# Patient Record
Sex: FEMALE | Race: WHITE
[De-identification: names, ages, dates, MRNs, and addresses within clinical notes are randomized per-mention and may not be internally consistent; named-entity substitution may affect disease eponyms.]

---
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
## 2019-03-07 ENCOUNTER — HOSPITAL ENCOUNTER (EMERGENCY)
Dept: HOSPITAL 63 - ER | Age: 19
Discharge: HOME | End: 2019-03-07
Payer: OTHER GOVERNMENT

## 2019-03-07 VITALS — DIASTOLIC BLOOD PRESSURE: 60 MMHG | SYSTOLIC BLOOD PRESSURE: 102 MMHG

## 2019-03-07 VITALS — WEIGHT: 158 LBS | BODY MASS INDEX: 24.8 KG/M2 | HEIGHT: 67 IN

## 2019-03-07 DIAGNOSIS — N39.0: Primary | ICD-10-CM

## 2019-03-07 DIAGNOSIS — Z87.440: ICD-10-CM

## 2019-03-07 LAB
AMORPH SED URNS QL MICRO: PRESENT /HPF
APTT PPP: YELLOW S
BACTERIA #/AREA URNS HPF: (no result) /HPF
BILIRUB UR QL STRIP: (no result)
FIBRINOGEN PPP-MCNC: (no result) MG/DL
GLUCOSE UR STRIP-MCNC: (no result) MG/DL
NITRITE UR QL STRIP: (no result)
RBC #/AREA URNS HPF: 0 /HPF (ref 0–2)
SP GR UR STRIP: 1.01
SQUAMOUS #/AREA URNS LPF: (no result) /LPF
UROBILINOGEN UR-MCNC: 0.2 MG/DL
WBC #/AREA URNS HPF: (no result) /HPF (ref 0–4)

## 2019-03-07 PROCEDURE — 87086 URINE CULTURE/COLONY COUNT: CPT

## 2019-03-07 PROCEDURE — 99283 EMERGENCY DEPT VISIT LOW MDM: CPT

## 2019-03-07 PROCEDURE — 81001 URINALYSIS AUTO W/SCOPE: CPT

## 2019-03-07 PROCEDURE — 81025 URINE PREGNANCY TEST: CPT

## 2019-03-07 NOTE — PHYS DOC
Past History


Past Medical History:  UTI, Other


Additional Past Medical Histor:  delio fernandes


Past Surgical History:  No Surgical History


Smoking:  Non-smoker


Alcohol Use:  Occasionally


Drug Use:  None





Adult General


Chief Complaint


Chief Complaint:  ABDOMINAL PAIN





HPI


HPI





Patient is a 18-year-old female who presents with symptoms of a UTI. She has 

had frequent UTIs, 3 in the past 2 months, and was recently diagnosed with 

interstitial cystitis. She is seeing a urologist at  for this. She was 

recently put on oxybutynin. She notes that she had a fever of 100.32-3 days 

ago. This seems to resolve. She also notes a little bit of bilateral flank 

pain. Denies any vaginal bleeding or discharge. Denies being sexually active.[]





Review of Systems


Review of Systems





Constitutional: Denies fever or chills []


Eyes: Denies change in visual acuity, redness, or eye pain []


HENT: Denies nasal congestion or sore throat []


Respiratory: Denies cough or shortness of breath []


Cardiovascular: No chest pain or palpitations[]


GI: Denies abdominal pain, nausea, vomiting, bloody stools or diarrhea []


: Denies hematuria, see history of present illness []


Musculoskeletal: Denies back pain or joint pain []


Integument: Denies rash or skin lesions []


Neurologic: Denies headache, focal weakness or sensory changes []


Endocrine: Denies polyuria or polydipsia []





All other systems were reviewed and found to be within normal limits, except as 

documented in this note.





Allergies


Allergies





Allergies








Coded Allergies Type Severity Reaction Last Updated Verified


 


  No Known Drug Allergies    7/20/18 No











Physical Exam


Physical Exam





Constitutional: Well developed, well nourished, no acute distress, non-toxic 

appearance. []


HENT: Normocephalic, atraumatic, bilateral external ears normal, oropharynx 

moist, no oral exudates, nose normal. []


Eyes: PERRLA, EOMI, conjunctiva normal, no discharge. [] 


Neck: Normal range of motion, no tenderness, supple, no stridor. [] 


Cardiovascular:Heart rate regular rhythm, no murmur []


Lungs & Thorax:  Bilateral breath sounds clear to auscultation []


Abdomen: Bowel sounds normal, soft, no tenderness, no masses, no pulsatile 

masses. [] 


Skin: Warm, dry, no erythema, no rash. [] 


Back: No tenderness, no CVA tenderness. [] 


Extremities: No tenderness, no cyanosis, no clubbing, ROM intact, no edema. [] 


Neurologic: Alert and oriented X 3, normal motor function, normal sensory 

function, no focal deficits noted. []


Psychologic: Affect normal, judgement normal, mood normal. []





Current Patient Data


Lab Results





 Laboratory Tests








Test


 3/7/19


12:30


 


POC Urine HCG, Qualitative


 hcg negative


(Negative)











EKG


EKG


[]





Radiology/Procedures


Radiology/Procedures


[]





Course & Med Decision Making


Course & Med Decision Making


Pertinent Labs and Imaging studies reviewed. (See chart for details)





Medical decision making: Patient appears to have urinary tract infection, will 

treat with a different antibiotic than the sulfa medicine that she has been 

taking recently. Patient has follow-up with her primary urologist. Discussed 

findings and plan with patient who voiced understanding. All questions were 

answered. Patient was discharged in improved condition.[]





Dragon Disclaimer


Dragon Disclaimer


This electronic medical record was generated, in whole or in part, using a 

voice recognition dictation system.





Departure


Departure:


Impression:  


 Primary Impression:  


 Urinary tract infection


Disposition:  01 HOME, SELF-CARE


Condition:  IMPROVED


Referrals:  


ABDIRASHID KOLB DO (PCP)


Follow-up in 2 days


Patient Instructions:  Interstitial Cystitis, Urinary Tract Infection





Additional Instructions:  


Drink plenty of fluids. Follow-up with your primary care team in 2 days. Take 

the antibiotics as prescribed. Return to the ER if worsening discomfort or any 

other concerns.


Scripts


Meloxicam (MELOXICAM) 7.5 Mg Tablet


7.5 MG PO DAILY for PAIN, #20 TAB


   Prov: REGI NOLEN DO         3/7/19 


Cephalexin (KEFLEX) 500 Mg Capsule


500 MG PO TID for uti for 10 Days, #30 CAP


   Prov: REGI NOLEN DO         3/7/19





Problem Qualifiers








 Primary Impression:  


 Urinary tract infection


 Urinary tract infection type:  site unspecified  Hematuria presence:  without 

hematuria  Qualified Codes:  N39.0 - Urinary tract infection, site not specified








REGI NOLEN DO Mar 7, 2019 12:42

## 2019-03-08 ENCOUNTER — HOSPITAL ENCOUNTER (EMERGENCY)
Dept: HOSPITAL 63 - ER | Age: 19
Discharge: HOME | End: 2019-03-08
Payer: OTHER GOVERNMENT

## 2019-03-08 VITALS — HEIGHT: 67 IN | BODY MASS INDEX: 25.95 KG/M2 | WEIGHT: 165.35 LBS

## 2019-03-08 VITALS — SYSTOLIC BLOOD PRESSURE: 110 MMHG | DIASTOLIC BLOOD PRESSURE: 59 MMHG

## 2019-03-08 DIAGNOSIS — Z87.440: ICD-10-CM

## 2019-03-08 DIAGNOSIS — K52.9: Primary | ICD-10-CM

## 2019-03-08 LAB
ALBUMIN SERPL-MCNC: 4 G/DL (ref 3.4–5)
ALBUMIN/GLOB SERPL: 0.9 {RATIO} (ref 1–1.7)
ALP SERPL-CCNC: 71 U/L (ref 46–116)
ALT SERPL-CCNC: 23 U/L (ref 14–59)
ANION GAP SERPL CALC-SCNC: 12 MMOL/L (ref 6–14)
APTT PPP: YELLOW S
AST SERPL-CCNC: 18 U/L (ref 15–37)
BACTERIA #/AREA URNS HPF: (no result) /HPF
BASOPHILS # BLD AUTO: 0 X10^3/UL (ref 0–0.2)
BASOPHILS NFR BLD: 0 % (ref 0–3)
BILIRUB SERPL-MCNC: 0.4 MG/DL (ref 0.2–1)
BILIRUB UR QL STRIP: (no result)
BUN/CREAT SERPL: 20 (ref 6–20)
CA-I SERPL ISE-MCNC: 12 MG/DL (ref 7–20)
CALCIUM SERPL-MCNC: 9.6 MG/DL (ref 8.5–10.1)
CHLORIDE SERPL-SCNC: 103 MMOL/L (ref 98–107)
CO2 SERPL-SCNC: 24 MMOL/L (ref 21–32)
CREAT SERPL-MCNC: 0.6 MG/DL (ref 0.6–1)
EOSINOPHIL NFR BLD: 0.1 X10^3/UL (ref 0–0.7)
EOSINOPHIL NFR BLD: 1 % (ref 0–3)
ERYTHROCYTE [DISTWIDTH] IN BLOOD BY AUTOMATED COUNT: 12.9 % (ref 11.5–14.5)
FIBRINOGEN PPP-MCNC: CLEAR MG/DL
GFR SERPLBLD BASED ON 1.73 SQ M-ARVRAT: 128.8 ML/MIN
GLOBULIN SER-MCNC: 4.3 G/DL (ref 2.2–3.8)
GLUCOSE SERPL-MCNC: 90 MG/DL (ref 70–99)
GLUCOSE UR STRIP-MCNC: (no result) MG/DL
HCT VFR BLD CALC: 42.9 % (ref 36–47)
HGB BLD-MCNC: 14.8 G/DL (ref 12–15.5)
LIPASE: 97 U/L (ref 73–393)
LYMPHOCYTES # BLD: 0.7 X10^3/UL (ref 1–4.8)
LYMPHOCYTES NFR BLD AUTO: 7 % (ref 24–48)
MCH RBC QN AUTO: 29 PG (ref 25–35)
MCHC RBC AUTO-ENTMCNC: 34 G/DL (ref 31–37)
MCV RBC AUTO: 85 FL (ref 79–100)
MONO #: 0.6 X10^3/UL (ref 0–1.1)
MONOCYTES NFR BLD: 6 % (ref 0–9)
NEUT #: 8.1 X10^3UL (ref 1.8–7.7)
NEUTROPHILS NFR BLD AUTO: 85 % (ref 31–73)
NITRITE UR QL STRIP: (no result)
PLATELET # BLD AUTO: 263 X10^3/UL (ref 140–400)
POTASSIUM SERPL-SCNC: 4.3 MMOL/L (ref 3.5–5.1)
PROT SERPL-MCNC: 8.3 G/DL (ref 6.4–8.2)
RBC # BLD AUTO: 5.05 X10^6/UL (ref 3.5–5.4)
RBC #/AREA URNS HPF: (no result) /HPF (ref 0–2)
SODIUM SERPL-SCNC: 139 MMOL/L (ref 136–145)
SP GR UR STRIP: 1.02
SQUAMOUS #/AREA URNS LPF: (no result) /LPF
UROBILINOGEN UR-MCNC: 0.2 MG/DL
WBC # BLD AUTO: 9.5 X10^3/UL (ref 4–11)
WBC #/AREA URNS HPF: (no result) /HPF (ref 0–4)

## 2019-03-08 PROCEDURE — 80053 COMPREHEN METABOLIC PANEL: CPT

## 2019-03-08 PROCEDURE — 83690 ASSAY OF LIPASE: CPT

## 2019-03-08 PROCEDURE — 81001 URINALYSIS AUTO W/SCOPE: CPT

## 2019-03-08 PROCEDURE — 36415 COLL VENOUS BLD VENIPUNCTURE: CPT

## 2019-03-08 PROCEDURE — 99283 EMERGENCY DEPT VISIT LOW MDM: CPT

## 2019-03-08 PROCEDURE — 96361 HYDRATE IV INFUSION ADD-ON: CPT

## 2019-03-08 PROCEDURE — 96375 TX/PRO/DX INJ NEW DRUG ADDON: CPT

## 2019-03-08 PROCEDURE — 85025 COMPLETE CBC W/AUTO DIFF WBC: CPT

## 2019-03-08 PROCEDURE — 96374 THER/PROPH/DIAG INJ IV PUSH: CPT

## 2019-03-08 NOTE — PHYS DOC
Past History


Past Medical History:  UTI, Other


Additional Past Medical Histor:  delio fernandes


Past Surgical History:  No Surgical History


Smoking:  Non-smoker


Alcohol Use:  Occasionally


Drug Use:  None





Adult General


Chief Complaint


Chief Complaint:  NAUSEA/VOMITING/DIARRHEA





[facility]


HPI





Patient is a 18 year old female who presents with complaining of nausea and 

vomiting and diarrhea. Patient complaining of 4-5 episodes of nonbloody 

diarrhea and 4 episodes of vomiting since this morning with lower abdominal 

cramping pain during episodes of vomiting and diarrhea. Patient denies fever 

and chills, URI symptoms, sick contact, urinary symptom. Patient was seen in 

this emergency room yesterday with diagnosis of UTI and treated with Keflex.





Review of Systems


Review of Systems





Constitutional: Denies fever or chills []


Eyes: Denies change in visual acuity, redness, or eye pain []


HENT: Denies nasal congestion or sore throat []


Respiratory: Denies cough or shortness of breath []


Cardiovascular: No additional information not addressed in HPI []


GI: Denies abdominal pain, nausea, vomiting, bloody stools or diarrhea []


: Denies dysuria or hematuria []


Musculoskeletal: Denies back pain or joint pain []


Integument: Denies rash or skin lesions []


Neurologic: Denies headache, focal weakness or sensory changes []


Endocrine: Denies polyuria or polydipsia []





All other systems were reviewed and found to be within normal limits, except as 

documented in this note.





Current Medications


Current Medications





Current Medications








 Medications


  (Trade)  Dose


 Ordered  Sig/Kortney  Start Time


 Stop Time Status Last Admin


Dose Admin


 


 Ketorolac


 Tromethamine


  (Toradol 30mg


 Vial)  30 mg  1X  ONCE  3/8/19 12:50


 3/8/19 12:51 DC 3/8/19 12:42


30 MG


 


 Ondansetron HCl


  (Zofran)  4 mg  1X  ONCE  3/8/19 12:50


 3/8/19 12:51 DC 3/8/19 12:42


4 MG


 


 Sodium Chloride  1,000 ml @ 


 1,000 mls/hr  Q1H  3/8/19 12:24


 3/8/19 13:23  3/8/19 12:42


1,000 MLS/HR











Allergies


Allergies





Allergies








Coded Allergies Type Severity Reaction Last Updated Verified


 


  No Known Drug Allergies    7/20/18 No











Physical Exam


Physical Exam





Constitutional: Well developed, well nourished, mild distress, non-toxic 

appearance. []


HENT: Normocephalic, atraumatic, oropharynx moist.


Eyes: PERRLA, EOMI, conjunctiva normal, no discharge. [] 


Neck: Normal range of motion, no tenderness, supple, no stridor. [] 


Cardiovascular:Heart rate regular rhythm, no murmur []


Lungs & Thorax:  Bilateral breath sounds clear to auscultation []


Abdomen: Bowel sounds normal, soft, no tenderness, no masses, no pulsatile 

masses. [] 


Skin: Warm, dry, no erythema, no rash. [] 


Back: No tenderness, no CVA tenderness. [] 


Extremities: No tenderness, no cyanosis, no clubbing, ROM intact, no edema. [] 


Neurologic: Alert and oriented X 3, normal motor function, normal sensory 

function, no focal deficits noted. []


Psychologic: Affect normal, judgement normal, mood normal. []





Current Patient Data


Vital Signs





 Vital Signs








  Date Time  Temp Pulse Resp B/P (MAP) Pulse Ox O2 Delivery O2 Flow Rate FiO2


 


3/8/19 12:45  90 16 106/57 (73) 97 Room Air  








Lab Results





 Laboratory Tests








Test


 3/8/19


12:22 3/8/19


12:34


 


Urine Collection Type Unknown   


 


Urine Color Yellow   


 


Urine Clarity Clear   


 


Urine pH 5.5   


 


Urine Specific Gravity 1.025   


 


Urine Protein


 Neg


(NEG-TRACE) 





 


Urine Glucose (UA)


 Neg mg/dL


(NEG) 





 


Urine Ketones (Stick)


 Neg mg/dL


(NEG) 





 


Urine Blood Trace (NEG)   


 


Urine Nitrite Neg (NEG)   


 


Urine Bilirubin Neg (NEG)   


 


Urine Urobilinogen Dipstick


 0.2 mg/dL (0.2


mg/dL) 





 


Urine Leukocyte Esterase Neg (NEG)   


 


Urine RBC


 Occ /HPF (0-2)


 





 


Urine WBC


 Occ /HPF (0-4)


 





 


Urine Squamous Epithelial


Cells Few /LPF  


 





 


Urine Bacteria


 Few /HPF


(0-FEW) 





 


White Blood Count


 


 9.5 x10^3/uL


(4.0-11.0)


 


Red Blood Count


 


 5.05 x10^6/uL


(3.50-5.40)


 


Hemoglobin


 


 14.8 g/dL


(12.0-15.5)


 


Hematocrit


 


 42.9 %


(36.0-47.0)


 


Mean Corpuscular Volume


 


 85 fL ()





 


Mean Corpuscular Hemoglobin  29 pg (25-35)  


 


Mean Corpuscular Hemoglobin


Concent 


 34 g/dL


(31-37)


 


Red Cell Distribution Width


 


 12.9 %


(11.5-14.5)


 


Platelet Count


 


 263 x10^3/uL


(140-400)


 


Neutrophils (%) (Auto)  85 % (31-73)  H


 


Lymphocytes (%) (Auto)  7 % (24-48)  L


 


Monocytes (%) (Auto)  6 % (0-9)  


 


Eosinophils (%) (Auto)  1 % (0-3)  


 


Basophils (%) (Auto)  0 % (0-3)  


 


Neutrophils # (Auto)


 


 8.1 x10^3uL


(1.8-7.7)  H


 


Lymphocytes # (Auto)


 


 0.7 x10^3/uL


(1.0-4.8)  L


 


Monocytes # (Auto)


 


 0.6 x10^3/uL


(0.0-1.1)


 


Eosinophils # (Auto)


 


 0.1 x10^3/uL


(0.0-0.7)


 


Basophils # (Auto)


 


 0.0 x10^3/uL


(0.0-0.2)


 


Sodium Level


 


 139 mmol/L


(136-145)


 


Potassium Level


 


 4.3 mmol/L


(3.5-5.1)


 


Chloride Level


 


 103 mmol/L


()


 


Carbon Dioxide Level


 


 24 mmol/L


(21-32)


 


Anion Gap  12 (6-14)  


 


Blood Urea Nitrogen


 


 12 mg/dL


(7-20)


 


Creatinine


 


 0.6 mg/dL


(0.6-1.0)


 


Estimated GFR


(Cockcroft-Gault) 


 128.8  





 


BUN/Creatinine Ratio  20 (6-20)  


 


Glucose Level


 


 90 mg/dL


(70-99)


 


Calcium Level


 


 9.6 mg/dL


(8.5-10.1)


 


Total Bilirubin


 


 0.4 mg/dL


(0.2-1.0)


 


Aspartate Amino Transferase


(AST) 


 18 U/L (15-37)





 


Alanine Aminotransferase (ALT)


 


 23 U/L (14-59)





 


Alkaline Phosphatase


 


 71 U/L


()


 


Total Protein


 


 8.3 g/dL


(6.4-8.2)  H


 


Albumin


 


 4.0 g/dL


(3.4-5.0)


 


Albumin/Globulin Ratio


 


 0.9 (1.0-1.7)


L


 


Lipase


 


 97 U/L


()











EKG


EKG


[]





Radiology/Procedures


Radiology/Procedures


[]





Course & Med Decision Making


Course & Med Decision Making


Pertinent Labs and Imaging studies reviewed. (See chart for details)





Evaluation of patient in ER showed 18-year-old male patient with complaining of 

episodes of nausea and vomiting and diarrhea since this morning. Patient was 

started on Keflex yesterday. Labs was unremarkable. Patient treated with IV 

fluid and Zofran and tolerated oral intake. UA did not show infection. Patient 

was advised to continue Keflex for 2 more days.





Dragon Disclaimer


Dragon Disclaimer


This electronic medical record was generated, in whole or in part, using a 

voice recognition dictation system.





Departure


Departure:


Impression:  


 Primary Impression:  


 Acute gastroenteritis


 Additional Impression:  


 History of UTI


Disposition:  01 HOME, SELF-CARE (at 1324)


Referrals:  


ABDIRASHID KOLB DO (PCP)


Patient Instructions:  Viral Gastroenteritis





Additional Instructions:  


Drink plenty of liquids


Follow-up with your primary care physician in 3-5 days


Return to ER if not getting better


Do not eat solid food today


Continue home antibiotic for 2 more days


Scripts


Ondansetron Hcl (ZOFRAN) 4 Mg Tablet


1 TAB PO Q6HRS for nausea and vomiting, #12 TAB


   Prov: SOLE AYALA MD         3/8/19





Problem Qualifiers











SOLE AYALA MD Mar 8, 2019 13:26

## 2019-04-01 ENCOUNTER — HOSPITAL ENCOUNTER (EMERGENCY)
Dept: HOSPITAL 63 - ER | Age: 19
Discharge: HOME | End: 2019-04-01
Payer: OTHER GOVERNMENT

## 2019-04-01 VITALS — DIASTOLIC BLOOD PRESSURE: 80 MMHG | SYSTOLIC BLOOD PRESSURE: 111 MMHG

## 2019-04-01 VITALS — BODY MASS INDEX: 24.33 KG/M2 | WEIGHT: 155 LBS | HEIGHT: 67 IN

## 2019-04-01 DIAGNOSIS — N30.00: Primary | ICD-10-CM

## 2019-04-01 DIAGNOSIS — Z87.440: ICD-10-CM

## 2019-04-01 LAB
APTT PPP: YELLOW S
BACTERIA #/AREA URNS HPF: (no result) /HPF
BILIRUB UR QL STRIP: (no result)
FIBRINOGEN PPP-MCNC: (no result) MG/DL
GLUCOSE UR STRIP-MCNC: (no result) MG/DL
NITRITE UR QL STRIP: (no result)
RBC #/AREA URNS HPF: (no result) /HPF (ref 0–2)
SP GR UR STRIP: <=1.005
SQUAMOUS #/AREA URNS LPF: (no result) /LPF
UROBILINOGEN UR-MCNC: 0.2 MG/DL
WBC #/AREA URNS HPF: >40 /HPF (ref 0–4)

## 2019-04-01 PROCEDURE — 87086 URINE CULTURE/COLONY COUNT: CPT

## 2019-04-01 PROCEDURE — 81025 URINE PREGNANCY TEST: CPT

## 2019-04-01 PROCEDURE — 87186 SC STD MICRODIL/AGAR DIL: CPT

## 2019-04-01 PROCEDURE — 81001 URINALYSIS AUTO W/SCOPE: CPT

## 2019-04-01 PROCEDURE — 99283 EMERGENCY DEPT VISIT LOW MDM: CPT

## 2019-04-01 NOTE — PHYS DOC
Past History


Past Medical History:  UTI, Other


Additional Past Medical Histor:  delio fernandes


Past Surgical History:  No Surgical History


Smoking:  Non-smoker


Alcohol Use:  Occasionally


Drug Use:  None





Adult General


HPI


HPI





Patient is a 9-year-old female who presents with dysuria that started 

yesterday along with lower abdominal pain. This is similar to her previous 

urinary tract infections. She reports that she gets them approximately every 2 

weeks. She is seen a urologist in January for this and is pending an 

appointment later this month for possible Botox injection in the bladder. She 

denies any hematuria. Notes increased frequency and urgency. Discomfort is 

mild. No increased pain with bumps on the car ride to the emergency department. 

No fever. No back or flank pain.[]





Review of Systems


Review of Systems





Constitutional: Denies fever or chills []


Eyes: Denies change in visual acuity, redness, or eye pain []


HENT: Denies nasal congestion or sore throat []


Respiratory: Denies cough or shortness of breath []


Cardiovascular: No additional information not addressed in HPI []


GI: Denies nausea, vomiting, bloody stools or diarrhea []


: See history of present illness[]


Musculoskeletal: Denies back pain or joint pain []


Integument: Denies rash or skin lesions []


Neurologic: Denies headache, focal weakness or sensory changes []


Endocrine: Denies polyuria or polydipsia []





All other systems were reviewed and found to be within normal limits, except as 

documented in this note.





Allergies


Allergies





Allergies








Coded Allergies Type Severity Reaction Last Updated Verified


 


  No Known Drug Allergies    7/20/18 No











Physical Exam


Physical Exam





Constitutional: Well developed, well nourished, no acute distress, non-toxic 

appearance. []


HENT: Normocephalic, atraumatic, bilateral external ears normal, oropharynx 

moist, no oral exudates, nose normal. []


Eyes: PERRLA, EOMI, conjunctiva normal, no discharge. [] 


Neck: Normal range of motion, no tenderness, supple, no stridor. [] 


Cardiovascular:Heart rate regular rhythm, no murmur []


Lungs & Thorax:  Bilateral breath sounds clear to auscultation []


Abdomen: Bowel sounds normal, soft, no tenderness, sits up without difficulty, 

no rebound, no guarding, no rigidity, no McBurney's point tenderness, no Beltre 

sign, no masses, no pulsatile masses. [] 


Skin: Warm, dry, no erythema, no rash. [] 


Back: No tenderness, no CVA tenderness. [] 


Extremities: No tenderness, no cyanosis, no clubbing, ROM intact, no edema. [] 


Neurologic: Alert and oriented X 3, normal motor function, normal sensory 

function, no focal deficits noted. []


Psychologic: Affect normal, judgement normal, mood normal. []





EKG


EKG


[]





Radiology/Procedures


Radiology/Procedures


[]





Course & Med Decision Making


Course & Med Decision Making


Pertinent Labs and Imaging studies reviewed. (See chart for details)





ED course: Patient arrived, was placed in bed, and tolerated exam well. She had 

an extended emergency Department stay due to her first urine sample not having 

a few her in for laboratory analysis. She was able to drink water without any 

nausea or vomiting and provided a second sample which is noted above. Findings 

were discussed with the patient who voiced understanding. All questions were 

answered. She was discharged in improved condition.





Medical decision making: Patient appears to have urinary tract infection. She 

was most recently treated with Keflex so we will provide a different antibiotic 

to attempt to avoid resistance. There is no evidence of appendicitis, ectopic 

pregnancy, pyelonephritis nor systemic toxicity based on exam at this time.[]





Dragon Disclaimer


Dragon Disclaimer


This electronic medical record was generated, in whole or in part, using a 

voice recognition dictation system.





Departure


Departure:


Impression:  


 Primary Impression:  


 Urinary tract infection


Disposition:  01 HOME, SELF-CARE


Condition:  IMPROVED


Referrals:  


ABDIRASHID KOLB DO (PCP)


Follow-up in 2 days


Patient Instructions:  Urinary Tract Infection





Additional Instructions:  


Drink plenty of fluids. Follow-up with your regular doctor in 2 days. Return to 

the ER if worsening discomfort or any other concerns.


Scripts


Phenazopyridine Hcl (PYRIDIUM) 200 Mg Tablet


200 MG PO TID for dysuria for 2 Days, #6 TAB


   Prov: REGI NOLEN DO         4/1/19 


Nitrofurantoin Monohyd/M-Cryst (MACROBID 100 MG CAPSULE) 100 Mg Capsule


1 CAP PO BID for UTI, #20 CAP


   Prov: REGI NOLEN DO         4/1/19





Problem Qualifiers








 Primary Impression:  


 Urinary tract infection


 Urinary tract infection type:  acute cystitis  Hematuria presence:  without 

hematuria  Qualified Codes:  N30.00 - Acute cystitis without hematuria








REGI NOLEN DO Apr 1, 2019 07:23

## 2019-04-26 ENCOUNTER — HOSPITAL ENCOUNTER (EMERGENCY)
Dept: HOSPITAL 63 - ER | Age: 19
Discharge: HOME | End: 2019-04-26
Payer: OTHER GOVERNMENT

## 2019-04-26 VITALS — DIASTOLIC BLOOD PRESSURE: 57 MMHG | SYSTOLIC BLOOD PRESSURE: 100 MMHG

## 2019-04-26 VITALS — HEIGHT: 67 IN | BODY MASS INDEX: 24.33 KG/M2 | WEIGHT: 155 LBS

## 2019-04-26 DIAGNOSIS — R31.9: ICD-10-CM

## 2019-04-26 DIAGNOSIS — Z87.440: ICD-10-CM

## 2019-04-26 DIAGNOSIS — N39.0: Primary | ICD-10-CM

## 2019-04-26 LAB
APTT PPP: YELLOW S
BACTERIA #/AREA URNS HPF: (no result) /HPF
BILIRUB UR QL STRIP: (no result)
FIBRINOGEN PPP-MCNC: (no result) MG/DL
GLUCOSE UR STRIP-MCNC: (no result) MG/DL
NITRITE UR QL STRIP: (no result)
RBC #/AREA URNS HPF: (no result) /HPF (ref 0–2)
SP GR UR STRIP: 1.02
SQUAMOUS #/AREA URNS LPF: (no result) /LPF
UROBILINOGEN UR-MCNC: 0.2 MG/DL
WBC #/AREA URNS HPF: >40 /HPF (ref 0–4)

## 2019-04-26 PROCEDURE — 87086 URINE CULTURE/COLONY COUNT: CPT

## 2019-04-26 PROCEDURE — 81001 URINALYSIS AUTO W/SCOPE: CPT

## 2019-04-26 PROCEDURE — 87186 SC STD MICRODIL/AGAR DIL: CPT

## 2019-04-26 PROCEDURE — 99284 EMERGENCY DEPT VISIT MOD MDM: CPT

## 2019-04-26 PROCEDURE — 81025 URINE PREGNANCY TEST: CPT

## 2019-04-26 NOTE — PHYS DOC
Past History


Past Medical History:  UTI


Additional Past Medical Histor:  delio fernandes


Past Surgical History:  No Surgical History


Smoking:  Non-smoker


Alcohol Use:  Occasionally


Drug Use:  None





Adult General


Chief Complaint


Chief Complaint:  URINARY FREQUENCY





HPI


HPI





Patient is a 18 year old female who presents with complaint of urinary frequency

and dysuria.  Symptoms started last night.  Patient states that she has history 

of recurrent urinary tract infections.  She states that she averages about 1-2 

infections per month.  Is currently scheduled to see a urologist on May 2 at [facility].  She states that her doctor suspects she may have interstitial 

cystitis and pelvic floor dysfunction which could be contributing to her urinary

tract infections.  Was last treated on April 1, 2019 here at [place] emergency 

department with Macrobid.  Has not had any associated fevers or nausea.  Does 

admit to mild pelvic discomfort but no abdominal pain.  Has not taken any 

medications for her symptoms.  Last menstrual period was 2 weeks ago.





Review of Systems


Review of Systems





Constitutional: Denies fever or chills []


Eyes: Denies change in visual acuity, redness, or eye pain []


HENT: Denies nasal congestion or sore throat []


Respiratory: Denies cough or shortness of breath []


Cardiovascular: Denies chest pain or edema[]


GI: Denies abdominal pain, nausea, vomiting, bloody stools or diarrhea []


: Dysuria, urinary frequency, pelvic pain[]


Musculoskeletal: Denies back pain or joint pain []


Integument: Denies rash or skin lesions []


Neurologic: Denies headache, focal weakness or sensory changes []








All other systems were reviewed and found to be within normal limits, except as 

documented in this note.





Allergies


Allergies





Allergies








Coded Allergies Type Severity Reaction Last Updated Verified


 


  No Known Drug Allergies    7/20/18 No











Physical Exam


Physical Exam





Constitutional: Alert, afebrile, no acute distress. []


HENT: Normocephalic, atraumatic, bilateral external ears normal, oropharynx 

moist, no oral exudates, nose normal. []


Eyes: PERRLA, EOMI, conjunctiva normal, no discharge. [] 


Neck: Normal range of motion, no tenderness, supple, no stridor. [] 


Cardiovascular:Heart rate regular rhythm, no murmur []


Lungs & Thorax:  Bilateral breath sounds clear to auscultation []


Abdomen: Bowel sounds normal, soft, no tenderness, no masses, no pulsatile 

masses. [] 


Skin: Warm, dry, no erythema, no rash. [] 


Back: No tenderness, no CVA tenderness. [] 


Extremities: No tenderness, no cyanosis, no clubbing, ROM intact, no edema. [] 


Neurologic: Alert and oriented X 3, normal motor function, normal sensory 

function, no focal deficits noted. []





Current Patient Data


Vital Signs





                                   Vital Signs








  Date Time  Temp Pulse Resp B/P (MAP) Pulse Ox O2 Delivery O2 Flow Rate FiO2


 


4/26/19 10:05 98.2 88 16  99 Room Air  








Lab Results





Laboratory Tests








Test


 4/26/19


10:08 4/26/19


10:25


 


Urine Collection Type Unknown  


 


Urine Color Yellow  


 


Urine Clarity Cloudy  


 


Urine pH 5.5  


 


Urine Specific Gravity 1.025  


 


Urine Protein Neg  


 


Urine Glucose (UA) Neg mg/dL  


 


Urine Ketones (Stick) Neg mg/dL  


 


Urine Blood Trace  


 


Urine Nitrite Neg  


 


Urine Bilirubin Neg  


 


Urine Urobilinogen Dipstick 0.2 mg/dL  


 


Urine Leukocyte Esterase Mod  


 


Urine RBC 6-10 /HPF  


 


Urine WBC >40 /HPF  


 


Urine Squamous Epithelial


Cells Many /LPF 


 





 


Urine Bacteria Many /HPF  


 


Urine Mucus Slight /LPF  


 


Bedside Urine HCG, Qualitative  hcg negative 











EKG


EKG


Not performed[]





Radiology/Procedures


Radiology/Procedures


Not performed[]





Course & Med Decision Making


Course & Med Decision Making


Pertinent Labs and Imaging studies reviewed. (See chart for details)





Urinalysis results concerning for active infection.  Patient prescribed Bactrim 

for 7 day course of therapy.  Advised to continue plans for follow-up in one 

week with urologist as scheduled. Recommended return to the emergency department

 for any worsening symptoms.  Patient was understanding and in agreement with 

treatment plan.





Dragon Disclaimer


Dragon Disclaimer


This electronic medical record was generated, in whole or in part, using a voice

 recognition dictation system.





Departure


Departure:


Impression:  


   Primary Impression:  


   Urinary tract infection


Disposition:  01 HOME, SELF-CARE


Condition:  STABLE


Referrals:  


ABDIRASHID KOLB DO (PCP)


Patient Instructions:  Urinary Tract Infection





Additional Instructions:  


Follow-up with your urologist in 1 week as scheduled.  Return to the emergency 

department for any worsening symptoms.


Scripts


Sulfamethoxazole/Trimethoprim (BACTRIM DS TABLET) 1 Each Tablet


1 TAB PO BID, #14 TAB


   Prov: KATHIA CHAMPION MD         4/26/19





Problem Qualifiers








   Primary Impression:  


   Urinary tract infection


   Urinary tract infection type:  site unspecified  Hematuria presence:  with 

   hematuria  Qualified Codes:  N39.0 - Urinary tract infection, site not 

   specified; R31.9 - Hematuria, unspecified








KATHIA CHAMPION MD               Apr 26, 2019 10:20

## 2019-06-01 ENCOUNTER — HOSPITAL ENCOUNTER (EMERGENCY)
Dept: HOSPITAL 63 - ER | Age: 19
Discharge: HOME | End: 2019-06-01
Payer: OTHER GOVERNMENT

## 2019-06-01 VITALS
BODY MASS INDEX: 25.95 KG/M2 | SYSTOLIC BLOOD PRESSURE: 100 MMHG | WEIGHT: 165.35 LBS | HEIGHT: 67 IN | DIASTOLIC BLOOD PRESSURE: 57 MMHG

## 2019-06-01 DIAGNOSIS — Z87.440: ICD-10-CM

## 2019-06-01 DIAGNOSIS — N39.0: Primary | ICD-10-CM

## 2019-06-01 PROCEDURE — 81001 URINALYSIS AUTO W/SCOPE: CPT

## 2019-06-01 PROCEDURE — 87086 URINE CULTURE/COLONY COUNT: CPT

## 2019-06-01 PROCEDURE — 99284 EMERGENCY DEPT VISIT MOD MDM: CPT

## 2019-06-01 PROCEDURE — 81025 URINE PREGNANCY TEST: CPT

## 2019-06-01 NOTE — PHYS DOC
Past History


Past Medical History:  UTI, Other


Additional Past Medical Histor:  delio danlos, interstitial cystitis


Past Surgical History:  No Surgical History


Smoking:  Non-smoker


Alcohol Use:  Occasionally


Drug Use:  None





Adult General


Chief Complaint


Chief Complaint:  PAIN ON URINATION





HPI


HPI





Patient is a 18-year-old female presents with dysuria. This started about a day 

ago. Patient has a history of interstitial cystitis and is pending surgery on 

June 12 for this. No blood in the urine. Increased urgency and frequency. She 

was on Macrobid approximately a month ago and Bactrim approximately a month 

before that. No fevers. Nothing seems to make the symptoms better or worse. 

Symptoms are moderate in intensity.[]





Review of Systems


Review of Systems





Constitutional: Denies fever or chills []


Eyes: Denies change in visual acuity, redness, or eye pain []


HENT: Denies nasal congestion or sore throat []


Respiratory: Denies cough or shortness of breath []


Cardiovascular: No chest pain or palpitations[]


GI: Denies abdominal pain, nausea, vomiting, bloody stools or diarrhea []


: See history of previous illness[]


Musculoskeletal: Denies back pain or joint pain []


Integument: Denies rash or skin lesions []


Neurologic: Denies headache, focal weakness or sensory changes []


Endocrine: Denies polyuria or polydipsia []





All other systems were reviewed and found to be within normal limits, except as 

documented in this note.





Allergies


Allergies





Allergies








Coded Allergies Type Severity Reaction Last Updated Verified


 


  No Known Drug Allergies    7/20/18 No











Physical Exam


Physical Exam





Constitutional: Well developed, well nourished, no acute distress, non-toxic 

appearance. []


HENT: Normocephalic, atraumatic, bilateral external ears normal, oropharynx 

moist, no oral exudates, nose normal. []


Eyes: PERRLA, EOMI, conjunctiva normal, no discharge. [] 


Neck: Normal range of motion, no tenderness, supple, no stridor. [] 


Cardiovascular:Heart rate regular rhythm, no murmur []


Lungs & Thorax:  Bilateral breath sounds clear to auscultation []


Abdomen: Bowel sounds normal, soft, no tenderness, no masses, no pulsatile 

masses. [] 


Skin: Warm, dry, no erythema, no rash. [] 


Back: No tenderness, no CVA tenderness. [] 


Extremities: No tenderness, no cyanosis, no clubbing, ROM intact, no edema. [] 


Neurologic: Alert and oriented X 3, normal motor function, normal sensory 

function, no focal deficits noted. []


Psychologic: Affect normal, judgement normal, mood normal. []





EKG


EKG


[]





Radiology/Procedures


Radiology/Procedures


[]





Course & Med Decision Making


Course & Med Decision Making


Pertinent Labs and Imaging studies reviewed. (See chart for details)





Medical decision making: Patient with history of interstitial cystitis and 

dysuria most recently on Macrobid area did will treat with cephalexin, pain 

medicine, and given her history of previous yeast infection with antibiotics, 

will prescribe one dose, when necessary Diflucan for the end of the antibiotics.

 There is no evidence of pyelonephritis, nor systemic toxicity.[]





Dragon Disclaimer


Dragon Disclaimer


This electronic medical record was generated, in whole or in part, using a voice

recognition dictation system.





Departure


Departure:


Impression:  


   Primary Impression:  


   Urinary tract infection


Disposition:  01 HOME, SELF-CARE


Condition:  IMPROVED


Referrals:  


ABDIRASHID KOLB DO (PCP)


Follow-up in 2 days


Patient Instructions:  Urinary Tract Infection





Additional Instructions:  


Drink plenty of fluids. Follow-up with your regular doctor in 2 days. Take the 

medication as prescribed. Weight until the antibiotics, the Cephalexin, is 

finished before taking the Diflucan. Return to the ER if worsening discomfort, 

fever of more than 101, or any other concerns.


Scripts


Cephalexin (KEFLEX) 500 Mg Capsule


500 MG PO TID for UTI for 10 Days, #30 CAP


   Prov: REGI NOLEN DO         6/1/19 


Phenazopyridine Hcl (PYRIDIUM) 200 Mg Tablet


200 MG PO TID for DYSURIA for 2 Days, #6 TAB


   Prov: REGI NOLEN DO         6/1/19 


Fluconazole (DIFLUCAN) 150 Mg Tablet


1 TAB PO ONCE for CANDIDIASIS, #1 TAB 1 Refill


   Prov: REGI NOLEN DO         6/1/19





Problem Qualifiers








   Primary Impression:  


   Urinary tract infection


   Urinary tract infection type:  site unspecified  Hematuria presence:  without

   hematuria  Qualified Codes:  N39.0 - Urinary tract infection, site not 

   specified








REGI NOLEN DO           Jun 1, 2019 13:06

## 2019-12-19 ENCOUNTER — HOSPITAL ENCOUNTER (EMERGENCY)
Dept: HOSPITAL 63 - ER | Age: 19
Discharge: HOME | End: 2019-12-19
Payer: OTHER GOVERNMENT

## 2019-12-19 VITALS — BODY MASS INDEX: 25.16 KG/M2 | WEIGHT: 156.53 LBS | HEIGHT: 66 IN

## 2019-12-19 VITALS — SYSTOLIC BLOOD PRESSURE: 101 MMHG | DIASTOLIC BLOOD PRESSURE: 66 MMHG

## 2019-12-19 DIAGNOSIS — J02.9: Primary | ICD-10-CM

## 2019-12-19 DIAGNOSIS — Z87.440: ICD-10-CM

## 2019-12-19 DIAGNOSIS — R07.89: ICD-10-CM

## 2019-12-19 LAB
ALBUMIN SERPL-MCNC: 3.6 G/DL (ref 3.4–5)
ALBUMIN/GLOB SERPL: 0.9 {RATIO} (ref 1–1.7)
ALP SERPL-CCNC: 66 U/L (ref 46–116)
ALT SERPL-CCNC: 18 U/L (ref 14–59)
AMPHETAMINE/METHAMPHETAMINE: (no result)
ANION GAP SERPL CALC-SCNC: 8 MMOL/L (ref 6–14)
APTT PPP: YELLOW S
AST SERPL-CCNC: 14 U/L (ref 15–37)
BACTERIA #/AREA URNS HPF: (no result) /HPF
BARBITURATES UR-MCNC: (no result) UG/ML
BASOPHILS # BLD AUTO: 0.1 X10^3/UL (ref 0–0.2)
BASOPHILS NFR BLD: 1 % (ref 0–3)
BENZODIAZ UR-MCNC: (no result) UG/L
BILIRUB SERPL-MCNC: 0.5 MG/DL (ref 0.2–1)
BILIRUB UR QL STRIP: (no result)
BUN/CREAT SERPL: 11 (ref 6–20)
CA-I SERPL ISE-MCNC: 8 MG/DL (ref 7–20)
CALCIUM SERPL-MCNC: 9 MG/DL (ref 8.5–10.1)
CANNABINOIDS UR-MCNC: (no result) UG/L
CHLORIDE SERPL-SCNC: 101 MMOL/L (ref 98–107)
CO2 SERPL-SCNC: 26 MMOL/L (ref 21–32)
COCAINE UR-MCNC: (no result) NG/ML
CREAT SERPL-MCNC: 0.7 MG/DL (ref 0.6–1)
EOSINOPHIL NFR BLD: 0.1 X10^3/UL (ref 0–0.7)
EOSINOPHIL NFR BLD: 1 % (ref 0–3)
ERYTHROCYTE [DISTWIDTH] IN BLOOD BY AUTOMATED COUNT: 12.7 % (ref 11.5–14.5)
FIBRINOGEN PPP-MCNC: (no result) MG/DL
GFR SERPLBLD BASED ON 1.73 SQ M-ARVRAT: 106.7 ML/MIN
GLOBULIN SER-MCNC: 4.1 G/DL (ref 2.2–3.8)
GLUCOSE SERPL-MCNC: 97 MG/DL (ref 70–99)
GLUCOSE UR STRIP-MCNC: (no result) MG/DL
HCT VFR BLD CALC: 37.9 % (ref 36–47)
HGB BLD-MCNC: 13 G/DL (ref 12–15.5)
LIPASE: 97 U/L (ref 73–393)
LYMPHOCYTES # BLD: 1.4 X10^3/UL (ref 1–4.8)
LYMPHOCYTES NFR BLD AUTO: 10 % (ref 24–48)
MAGNESIUM SERPL-MCNC: 1.8 MG/DL (ref 1.8–2.4)
MCH RBC QN AUTO: 30 PG (ref 25–35)
MCHC RBC AUTO-ENTMCNC: 34 G/DL (ref 31–37)
MCV RBC AUTO: 86 FL (ref 79–100)
METHADONE SERPL-MCNC: (no result) NG/ML
MONO #: 0.9 X10^3/UL (ref 0–1.1)
MONOCYTES NFR BLD: 7 % (ref 0–9)
NEUT #: 10.9 X10^3UL (ref 1.8–7.7)
NEUTROPHILS NFR BLD AUTO: 81 % (ref 31–73)
NITRITE UR QL STRIP: (no result)
OPIATES UR-MCNC: (no result) NG/ML
PCP SERPL-MCNC: (no result) MG/DL
PLATELET # BLD AUTO: 222 X10^3/UL (ref 140–400)
POTASSIUM SERPL-SCNC: 3.7 MMOL/L (ref 3.5–5.1)
PROT SERPL-MCNC: 7.7 G/DL (ref 6.4–8.2)
RBC # BLD AUTO: 4.39 X10^6/UL (ref 3.5–5.4)
RBC #/AREA URNS HPF: (no result) /HPF (ref 0–2)
SODIUM SERPL-SCNC: 135 MMOL/L (ref 136–145)
SP GR UR STRIP: 1.01
SQUAMOUS #/AREA URNS LPF: (no result) /LPF
UROBILINOGEN UR-MCNC: 0.2 MG/DL
WBC # BLD AUTO: 13.4 X10^3/UL (ref 4–11)
WBC #/AREA URNS HPF: (no result) /HPF (ref 0–4)

## 2019-12-19 PROCEDURE — 85610 PROTHROMBIN TIME: CPT

## 2019-12-19 PROCEDURE — 87086 URINE CULTURE/COLONY COUNT: CPT

## 2019-12-19 PROCEDURE — 93005 ELECTROCARDIOGRAM TRACING: CPT

## 2019-12-19 PROCEDURE — 71275 CT ANGIOGRAPHY CHEST: CPT

## 2019-12-19 PROCEDURE — 81001 URINALYSIS AUTO W/SCOPE: CPT

## 2019-12-19 PROCEDURE — 83880 ASSAY OF NATRIURETIC PEPTIDE: CPT

## 2019-12-19 PROCEDURE — 87070 CULTURE OTHR SPECIMN AEROBIC: CPT

## 2019-12-19 PROCEDURE — 36415 COLL VENOUS BLD VENIPUNCTURE: CPT

## 2019-12-19 PROCEDURE — 80053 COMPREHEN METABOLIC PANEL: CPT

## 2019-12-19 PROCEDURE — 81025 URINE PREGNANCY TEST: CPT

## 2019-12-19 PROCEDURE — 85025 COMPLETE CBC W/AUTO DIFF WBC: CPT

## 2019-12-19 PROCEDURE — 83690 ASSAY OF LIPASE: CPT

## 2019-12-19 PROCEDURE — 80307 DRUG TEST PRSMV CHEM ANLYZR: CPT

## 2019-12-19 PROCEDURE — 83735 ASSAY OF MAGNESIUM: CPT

## 2019-12-19 PROCEDURE — 84443 ASSAY THYROID STIM HORMONE: CPT

## 2019-12-19 PROCEDURE — 96374 THER/PROPH/DIAG INJ IV PUSH: CPT

## 2019-12-19 PROCEDURE — 87880 STREP A ASSAY W/OPTIC: CPT

## 2019-12-19 PROCEDURE — 85730 THROMBOPLASTIN TIME PARTIAL: CPT

## 2019-12-19 PROCEDURE — 84484 ASSAY OF TROPONIN QUANT: CPT

## 2019-12-19 PROCEDURE — 99285 EMERGENCY DEPT VISIT HI MDM: CPT

## 2019-12-19 RX ADMIN — SODIUM CHLORIDE SCH MLS/HR: 0.9 INJECTION, SOLUTION INTRAVENOUS at 07:12

## 2019-12-19 NOTE — PHYS DOC
Past History


Past Medical History:  UTI, Other


Additional Past Medical Histor:  arian danlos, interstitial cystitis


Past Surgical History:  No Surgical History


Smoking:  Non-smoker


Alcohol Use:  Occasionally


Drug Use:  None





Adult General


HPI


HPI





Patient is a 19-year-old female presenting with chest pain that started 

approximately 3:00 this morning. It has a respirophasic component. Radiates into

her back. It is a little lower in position than her usual chest discomfort. No 

nausea or vomiting. Worse with lying supine. Symptoms are moderate in intensity.

She has taken no medication for the discomfort. She initially had nasal 

congestion and sore throat that started yesterday. No fever. No cough. No recent

travel. No nausea or vomiting.





She has a history of Arian-Danlos syndrome, is on birth control pills, 

nonsmoker, had an ORIF of her right leg this summer.[]





Review of Systems


Review of Systems





Constitutional: Denies fever or chills []


Eyes: Denies change in visual acuity, redness, or eye pain []


HENT: Denies ear pain or sinus pressure, see history of present illness[]


Respiratory: Denies cough or shortness of breath []


Cardiovascular: No additional information not addressed in HPI []


GI: Denies abdominal pain, nausea, vomiting, bloody stools or diarrhea []


: Denies dysuria or hematuria []


Musculoskeletal: Denies back pain or joint pain []


Integument: Denies rash or skin lesions []


Neurologic: Denies headache, focal weakness or sensory changes []


Endocrine: Denies polyuria or polydipsia []





All other systems were reviewed and found to be within normal limits, except as 

documented in this note.





Current Medications


Current Medications





Current Medications








 Medications


  (Trade)  Dose


 Ordered  Sig/Kortney  Start Time


 Stop Time Status Last Admin


Dose Admin


 


 Sodium Chloride  1,000 ml @ 


 1,000 mls/hr  Q1H  12/19/19 06:34


 12/19/19 07:33 UNV  














Allergies


Allergies





Allergies








Coded Allergies Type Severity Reaction Last Updated Verified


 


  No Known Drug Allergies    7/20/18 No











Physical Exam


Physical Exam





Constitutional: Well developed, well nourished, no acute distress, non-toxic 

appearance. []


HENT: Normocephalic, atraumatic, bilateral external ears normal, oropharynx 

moist, no oral exudates, nose normal. []


Eyes: PERRLA, EOMI, conjunctiva normal, no discharge. [] 


Neck: Normal range of motion, no tenderness, supple, no stridor. [] 


Cardiovascular:Heart rate is tachycardic with a regular rhythm, no murmur []


Lungs & Thorax:  Bilateral breath sounds clear to auscultation []


Abdomen: Bowel sounds normal, soft, no tenderness, no masses, no pulsatile 

masses. [] 


Skin: Warm, dry, no erythema, no rash. [] 


Back: No tenderness, no CVA tenderness. [] 


Extremities: No tenderness, no cyanosis, no clubbing, ROM intact, no edema. Scar

 in right lower extremity consistent with surgical history. Calf sizes are 

symmetric [] 


Neurologic: Alert and oriented X 3, normal motor function, normal sensory 

function, no focal deficits noted. []


Psychologic: Affect normal, judgement normal, mood normal. []





EKG


EKG


EKG shows a sinus tachycardia at 103 bpm, normal axis, QTC of 437 ms, no ST 

elevation. Interpreted by me at 0642[]





Radiology/Procedures


Radiology/Procedures


PROCEDURE: CT ANGIOGRAPHY CHEST





EXAM: CT ANGIOGRAPHY OF THE CHEST WITH AND WITHOUT CONTRAST.


 


HISTORY: Chest pain, tachycardia, Arian-Danlos syndrome.


 


TECHNIQUE: Computed tomographic angiography of the chest was performed 


before and after the intravenous administration of iodinated contrast. 3-D


maximum intensity projections were also performed.


 


COMPARISON: None.


 


FINDINGS: Images of the upper abdomen reveal no acute abnormality. Bone 


windows reveal no suspicious lesions.


 


No pulmonary emboli are identified. There is no aortic dissection or 


aneurysm. At the sinuses of Valsalva aortic diameter is 3.1 cm. In the 


ascending portion, 2.5 cm. In its descending portion, 1.6 cm.


 


There are no pathologically enlarged mediastinal or axillary lymph nodes. 


Soft tissue density in the anterior mediastinum is consistent with a 


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 There is no pleural or pericardial 


effusion. The heart is not enlarged.


 


Lung windows reveal no infiltrates. There is mild dependent atelectasis.


 


IMPRESSION:


1. No pulmonary embolism. No aortic dissection or aneurysm.[]





Course & Med Decision Making


Course & Med Decision Making


Pertinent Labs and Imaging studies reviewed. (See chart for details)





Emergency department course: Patient arrived, was placed in bed, and tolerated 

exam well. IV access was established, she was transported to and from CT without

 any complications. After the return of the laboratory test and imaging 

findings, these were discussed with the patient who voiced understanding. All 

questions were answered. She was discharged in improved condition.





Medical decision making: Initially concerned about possibility of dissecting 

aneurysm versus PE given the tachycardia and her history of Arian-Danlos 

syndrome. There is no evidence of these on imaging. No evidence of this being an

 acute coronary syndrome. No pneumonia, no pneumothorax, no dissecting 

esophageal rupture. No evidence of pancreatitis. No evidence of peritonsillar 

abscess. Nontoxic patient. This may be a viral syndrome with the upper 

respiratory infection/postnasal drainage triggering the sore throat and other 

symptoms.[]





Dragon Disclaimer


Dragon Disclaimer


This electronic medical record was generated, in whole or in part, using a voice

 recognition dictation system.





Departure


Departure:


Impression:  


   Primary Impression:  


   Upper respiratory infection


   Additional Impressions:  


   Pharyngitis


   Chest pain


Disposition:  01 HOME, SELF-CARE


Condition:  IMPROVED


Referrals:  


ABDIRASHID KOLB DO (PCP)


Follow-up in 2 days


Patient Instructions:  Chest Pain (Nonspecific), Upper Respiratory Infection, 

Adult, Viral and Bacterial Pharyngitis





Additional Instructions:  


Drink plenty of fluids. Follow-up with your regular doctor in 2 days. Return to 

the ER if worsening discomfort, difficulty breathing, or any other concerns.


Scripts


D-Methorphan Hb/Prometh Hcl (PROMETHAZINE-DM SYRUP) 118 Ml Syrup


5 ML PO PRN Q4HRS for CONGESTION, #120 ML


   Prov: REGI NOLEN DO         12/19/19 


Meloxicam (MELOXICAM) 7.5 Mg Tablet


7.5 MG PO DAILY for PAIN, #20 TAB


   Prov: REGI NOLEN DO         12/19/19





Problem Qualifiers








   Primary Impression:  


   Upper respiratory infection


   URI type:  unspecified URI  Qualified Codes:  J06.9 - Acute upper respiratory

    infection, unspecified


   Additional Impressions:  


   Pharyngitis


   Pharyngitis/tonsillitis etiology:  unspecified etiology  Qualified Codes:  

   J02.9 - Acute pharyngitis, unspecified


   Chest pain


   Chest pain type:  unspecified  Qualified Codes:  R07.9 - Chest pain, 

   unspecified








REGI NOLEN DO          Dec 19, 2019 06:42

## 2019-12-19 NOTE — RAD
EXAM: CT ANGIOGRAPHY OF THE CHEST WITH AND WITHOUT CONTRAST.

 

HISTORY: Chest pain, tachycardia, Arian-Danlos syndrome.

 

TECHNIQUE: Computed tomographic angiography of the chest was performed 

before and after the intravenous administration of iodinated contrast. 3-D

maximum intensity projections were also performed.

 

COMPARISON: None.

 

FINDINGS: Images of the upper abdomen reveal no acute abnormality. Bone 

windows reveal no suspicious lesions.

 

No pulmonary emboli are identified. There is no aortic dissection or 

aneurysm. At the sinuses of Valsalva aortic diameter is 3.1 cm. In the 

ascending portion, 2.5 cm. In its descending portion, 1.6 cm.

 

There are no pathologically enlarged mediastinal or axillary lymph nodes. 

Soft tissue density in the anterior mediastinum is consistent with a 

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 There is no pleural or pericardial 

effusion. The heart is not enlarged.

 

Lung windows reveal no infiltrates. There is mild dependent atelectasis.

 

IMPRESSION:

1. No pulmonary embolism. No aortic dissection or aneurysm.

 

*One or more of the following individualized dose reduction techniques 

were utilized for this examination:  

1. Automated exposure control.  

2. Adjustment of the mA and/or kV according to patient size.  

3. Use of iterative reconstruction technique.

 

Electronically signed by: RADHA Diego MD (12/19/2019 8:23 AM) 

[facility]

## 2019-12-21 NOTE — EKG
[facility] [address], [place] [address]

Test Date:    2019               Test Time:    06:39:51

Pat Name:     PARISH DEE            Department:   

Patient ID:   [id number]           Room:          

Gender:       F                        Technician:   

:          2000               Requested By: REGI NOLEN

Order Number: [id number]            Reading MD:     

                                 Measurements

Intervals                              Axis          

Rate:         103                      P:            42

RI:           148                      QRS:          54

QRSD:         82                       T:            25

QT:           332                                    

QTc:          437                                    

                           Interpretive Statements

SINUS TACHYCARDIA

OTHERWISE NORMAL ECG

RI6.01

No previous ECG available for comparison

## 2020-02-13 ENCOUNTER — HOSPITAL ENCOUNTER (EMERGENCY)
Dept: HOSPITAL 63 - ER | Age: 20
Discharge: HOME | End: 2020-02-13
Payer: OTHER GOVERNMENT

## 2020-02-13 VITALS
SYSTOLIC BLOOD PRESSURE: 104 MMHG | DIASTOLIC BLOOD PRESSURE: 55 MMHG | DIASTOLIC BLOOD PRESSURE: 55 MMHG | SYSTOLIC BLOOD PRESSURE: 104 MMHG

## 2020-02-13 VITALS — WEIGHT: 158.73 LBS | BODY MASS INDEX: 24.91 KG/M2 | HEIGHT: 67 IN

## 2020-02-13 DIAGNOSIS — N39.0: Primary | ICD-10-CM

## 2020-02-13 DIAGNOSIS — Z91.041: ICD-10-CM

## 2020-02-13 LAB
APTT PPP: (no result) S
BACTERIA #/AREA URNS HPF: (no result) /HPF
BILIRUB UR QL STRIP: (no result)
FIBRINOGEN PPP-MCNC: (no result) MG/DL
GLUCOSE UR STRIP-MCNC: (no result) MG/DL
NITRITE UR QL STRIP: (no result)
RBC #/AREA URNS HPF: (no result) /HPF (ref 0–2)
SP GR UR STRIP: >=1.03
SQUAMOUS #/AREA URNS LPF: (no result) /LPF
UROBILINOGEN UR-MCNC: 0.2 MG/DL
WBC #/AREA URNS HPF: >40 /HPF (ref 0–4)

## 2020-02-13 PROCEDURE — 96372 THER/PROPH/DIAG INJ SC/IM: CPT

## 2020-02-13 PROCEDURE — 87186 SC STD MICRODIL/AGAR DIL: CPT

## 2020-02-13 PROCEDURE — 81025 URINE PREGNANCY TEST: CPT

## 2020-02-13 PROCEDURE — 99283 EMERGENCY DEPT VISIT LOW MDM: CPT

## 2020-02-13 PROCEDURE — 81001 URINALYSIS AUTO W/SCOPE: CPT

## 2020-02-13 PROCEDURE — 87086 URINE CULTURE/COLONY COUNT: CPT

## 2020-02-13 NOTE — PHYS DOC
Past History


Past Medical History:  Other


Additional Past Medical Histor:  POTS, EDS, MITRAL VALVE PROLAPSE, INTERSTITIAL 

CYSTITIS


Past Surgical History:  Other


Additional Past Surgical Histo:  RIGHT ANKLE.


Smoking:  Non-smoker


Alcohol Use:  None


Drug Use:  None





Adult General


Chief Complaint


Chief Complaint:  PAIN ON URINATION





HPI


HPI





Patient is a [19-year-old female presenting with dysuria and burning with 

urination and urinary frequency onset a few days ago she has history of 

interstitial cystitis she takes daily Macrobid for prophylaxis no fever but she 

is having some back pain just this is typical of her UTIs. Last menstrual. Was 5

 days ago no vaginal discharge she has urology follow-up next week she's been 

getting Botox injections which have helped a lot.





Review of Systems


Review of Systems





Constitutional: Denies fever or chills []


Eyes: Denies change in visual acuity, redness, or eye pain []





Neurologic: Denies headache, focal weakness or sensory changes []


Endocrine: Denies polyuria or polydipsia []





All other systems were reviewed and found to be within normal limits, except as 

documented in this note.





Current Medications


Current Medications





Current Medications








 Medications


  (Trade)  Dose


 Ordered  Sig/Kortney  Start Time


 Stop Time Status Last Admin


Dose Admin


 


 Ceftriaxone Sodium


  (Rocephin Im)  1 gm  1X  ONCE  2/13/20 11:45


 2/13/20 11:39 DC 2/13/20 11:21


1 GM











Allergies


Allergies





Allergies








Uncoded Allergies Type Severity Reaction Last Updated Verified


 


  IV CONTRAST DYE Allergy Unknown  2/13/20 











Physical Exam


Physical Exam





Constitutional: Well developed, well nourished, no acute distress, non-toxic 

appearance. []


HENT: Normocephalic, atraumatic, bilateral external ears normal, oropharynx 

moist, no oral exudates, nose normal. []


Eyes: PERRLA, EOMI, conjunctiva normal, no discharge. [] 


Neck: Normal range of motion, no tenderness, supple, no stridor. [] 





Abdomen: Bowel sounds normal, soft, mild suprapubic and mild CVA tenderness


Skin: Warm, dry, no erythema, no rash. [] 


[] 


Extremities: No tenderness, no cyanosis, no clubbing, ROM intact, no edema. [] 


Neurologic: Alert and oriented X 3, normal motor function, normal sensory 

function, no focal deficits noted. []


Psychologic: Affect normal, judgement normal, mood normal. []





Current Patient Data


Vital Signs





                                   Vital Signs








  Date Time  Temp Pulse Resp B/P (MAP) Pulse Ox O2 Delivery O2 Flow Rate FiO2


 


2/13/20 11:38  99 18 104/55 (71) 97 Room Air  


 


2/13/20 10:36 98.2       








Lab Results





                                Laboratory Tests








Test


 2/13/20


10:22 2/13/20


10:33


 


Urine Collection Type Unknown   


 


Urine Color Radha   


 


Urine Clarity Turbid   


 


Urine pH 5.5   


 


Urine Specific Gravity >=1.030   


 


Urine Protein


 30 mg/dl


(NEG-TRACE) 





 


Urine Glucose (UA)


 Neg mg/dL


(NEG) 





 


Urine Ketones (Stick)


 80 mg/dL (NEG)


 





 


Urine Blood Mod (NEG)   


 


Urine Nitrite Pos (NEG)   


 


Urine Bilirubin Neg (NEG)   


 


Urine Urobilinogen Dipstick


 0.2 mg/dL (0.2


mg/dL) 





 


Urine Leukocyte Esterase Mod (NEG)   


 


Urine RBC


 6-10 /HPF


(0-2) 





 


Urine WBC


 >40 /HPF (0-4)


 





 


Urine Squamous Epithelial


Cells Mod /LPF  


 





 


Urine Transitional Epithelial


Cells Few /LPF  


 





 


Urine Bacteria


 Many /HPF


(0-FEW) 





 


Urine Mucus  /LPF   


 


POC Urine HCG, Qualitative


 


 hcg negative


(Negative)











EKG


EKG


[]





Radiology/Procedures


Radiology/Procedures


[]





Course & Med Decision Making


Course & Med Decision Making


Pertinent Labs and Imaging studies reviewed. (See chart for details)





[]Patient has evidence of a UTI known interstitial cystitis intramuscular 

ceftriaxone was provided and advised to go up to a treatment dose of Macrobid 

100 twice a day she says that has worked for her before she knows there is a 

culture pending she is taking prophylactic Macrobid once daily at this time. 

Return precautions discussed and she voiced understanding





Dragon Disclaimer


Dragon Disclaimer


This electronic medical record was generated, in whole or in part, using a voice

 recognition dictation system.





Departure


Departure:


Impression:  


   Primary Impression:  


   UTI (urinary tract infection)


Disposition:  01 HOME, SELF-CARE


Condition:  STABLE


Patient Instructions:  Urinary Tract Infection, Easy-to-Read





Additional Instructions:  


continue your macrobid and see urologist next week.


Scripts


Fluconazole (DIFLUCAN) 150 Mg Tablet


1 TAB PO ONCE for yeast, #1 TAB 1 Refill


   Prov: BROOKLYN FREY MD         2/13/20











BROOKLYN FREY MD            Feb 13, 2020 12:51

## 2020-07-02 ENCOUNTER — HOSPITAL ENCOUNTER (OUTPATIENT)
Dept: HOSPITAL 63 - LAB | Age: 20
Discharge: HOME | End: 2020-07-02
Attending: NURSE ANESTHETIST, CERTIFIED REGISTERED
Payer: OTHER GOVERNMENT

## 2020-07-02 DIAGNOSIS — Z11.59: Primary | ICD-10-CM

## 2020-07-07 ENCOUNTER — HOSPITAL ENCOUNTER (OUTPATIENT)
Dept: HOSPITAL 63 - SURG | Age: 20
End: 2020-07-07
Attending: INTERNAL MEDICINE
Payer: OTHER GOVERNMENT

## 2020-07-07 VITALS
DIASTOLIC BLOOD PRESSURE: 64 MMHG | SYSTOLIC BLOOD PRESSURE: 114 MMHG | SYSTOLIC BLOOD PRESSURE: 114 MMHG | DIASTOLIC BLOOD PRESSURE: 64 MMHG

## 2020-07-07 DIAGNOSIS — R11.0: Primary | ICD-10-CM

## 2020-07-07 DIAGNOSIS — Z72.89: ICD-10-CM

## 2020-07-07 DIAGNOSIS — K21.0: ICD-10-CM

## 2020-07-07 DIAGNOSIS — K29.50: ICD-10-CM

## 2020-07-07 LAB — U PREG PATIENT: NEGATIVE

## 2020-07-07 PROCEDURE — 88342 IMHCHEM/IMCYTCHM 1ST ANTB: CPT

## 2020-07-07 PROCEDURE — 88305 TISSUE EXAM BY PATHOLOGIST: CPT

## 2020-07-07 PROCEDURE — 43239 EGD BIOPSY SINGLE/MULTIPLE: CPT

## 2020-07-07 PROCEDURE — 43450 DILATE ESOPHAGUS 1/MULT PASS: CPT

## 2020-07-07 PROCEDURE — 81025 URINE PREGNANCY TEST: CPT

## 2020-07-08 NOTE — PATHOLOGY
[facility] Accession Number: [id number]

.                                                                01

Material submitted:                                        .

PART A: small bowel - SMALL BOWEL

PART B: esophagus - ESOPHAGEAL

PART C: stomach - ANTRUM BIOPSY

.                                                                02

**********************************************************************

Diagnosis:

A.  Small bowel biopsies:

- No significant pathologic abnormalities.

.

B.  Esophageal biopsies:

- Reflux esophagitis.

.

C.  Gastric biopsies, antrum:

- Chronic gastritis, mild.

(JPM:marty; 07/08/2020)

[facility]  07/08/2020  0853 Local

**********************************************************************

.                                                                02

Comment:

Sections of the small bowel biopsy reveal segments of duodenal and small

intestine mucosa.  Where best oriented, the mucosal villi show no

sprue-like changes or significant inflammatory changes.

.

Sections of the esophageal biopsy reveal segments of tangentially oriented

hyperplastic squamous esophageal mucosa.  The findings are consistent with

reflux esophagitis.  There is no evidence of Uriostegui's change, dysplasia,

or malignancy.

.

Sections of the gastric antral biopsy show congestion and mild chronic

inflammation.  A properly controlled immunoperoxidase stain for

Helicobacter is negative for Helicobacter organisms.

(JPM:marty; 07/08/2020)

.

Special stain performed:  Immunoperoxidase stain for Helicobacter on C1

.                                                                02

Electronically signed:                                     .

Boaz Archibald MD, Pathologist

NPI- [id number]

.                                                                01

Gross description:                                         .

A. The specimen is received in formalin, labeled "Izaguirre, Aleyda, small

bowel" and consists of multiple fragments of pink-tan tissue measuring 1.3

x 0.6 x 0.3 cm in aggregate which are entirely submitted in A1.

.

B. The specimen is received in formalin, labeled "Izaguirre, Aleyda,

esophageal" and consists of a few translucent fragments of pink-gray

tissue measuring 1.1 x 1.0 x 0.2 cm in aggregate which are entirely

submitted in B1.

.

C. The specimen is received in formalin, labeled "Izaguirre, Aleyda, antrum

rule out H. pylori" and consists of 2 fragments of pink-tan tissue

measuring 0.3 x 0.3 cm and 0.5 x 0.3 cm which are entirely submitted in

C1.

(SDY; 7/7/2020)

SYU/SYU  07/07/2020  1705 Local

.                                                                02

Pathologist provided ICD-10:

K21.0, K29.50

.                                                                02

CPT                                                        .

511216, 955633, 509243, [id number]

Specimen Comment: A courtesy copy of this report has been sent to [phone], [phone]

Specimen Comment: [id number]

Specimen Comment: Report sent to  / DR MCDERMOTT

***Performed at:  01

   [facility]

   [address], [place]  [id number]

   MD Stephane Pittman MD Phone:  [phone]

***Performed at:  02

   [facility]

   [address] [place], [place]  [id number]

   MD Boaz Archibald MD Phone:  [phone]

## 2020-07-27 ENCOUNTER — HOSPITAL ENCOUNTER (OUTPATIENT)
Dept: HOSPITAL 63 - NM | Age: 20
End: 2020-07-27
Attending: INTERNAL MEDICINE
Payer: OTHER GOVERNMENT

## 2020-07-27 DIAGNOSIS — R68.81: ICD-10-CM

## 2020-07-27 DIAGNOSIS — R14.0: ICD-10-CM

## 2020-07-27 DIAGNOSIS — K30: Primary | ICD-10-CM

## 2020-07-27 PROCEDURE — 78264 GASTRIC EMPTYING IMG STUDY: CPT

## 2020-07-27 PROCEDURE — A9541 TC99M SULFUR COLLOID: HCPCS

## 2020-07-27 NOTE — RAD
EXAM: Nuclear gastric emptying scan.

 

HISTORY: Nausea and vomiting. Cholecystitis. Bloating.

 

COMPARISON: None.

 

TECHNIQUE: Serial static images were obtained over the stomach following 

oral administration of 2.0 mCi of 99m-Tc sulfur colloid.

 

FINDINGS: The stomach empties into the small bowel without evidence of 

reflux in the area of the esophagus. The estimated time for half emptying 

of gastric contents, i.e. 'gastric emptying time' is 113 minutes (normal 

is 66 +/- 22 minutes). There is 70 percent retained tracer activity within

the stomach at one hour, 41 percent retained tracer activity within 

stomach at 2 hours, 9 percent retained tracer activity within stomach at 3

hours, and 0 percent retained tracer activity within stomach at 4 hours.

 

IMPRESSION: Delayed gastric emptying half-time of nearly 2 hours. There is

normal gastric emptying at 3 hours and 4 hours.

 

Electronically signed by: Malgorzata Herman MD (7/27/2020 1:47 PM) [id number]